# Patient Record
Sex: MALE | Race: BLACK OR AFRICAN AMERICAN | Employment: UNEMPLOYED | URBAN - METROPOLITAN AREA
[De-identification: names, ages, dates, MRNs, and addresses within clinical notes are randomized per-mention and may not be internally consistent; named-entity substitution may affect disease eponyms.]

---
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
## 2025-01-23 ENCOUNTER — HOSPITAL ENCOUNTER (EMERGENCY)
Age: 25
Discharge: HOME | End: 2025-01-23
Attending: EMERGENCY MEDICINE
Payer: MEDICAID

## 2025-01-23 ENCOUNTER — APPOINTMENT (OUTPATIENT)
Dept: GENERAL RADIOLOGY | Age: 25
End: 2025-01-23
Payer: MEDICAID

## 2025-01-23 VITALS
DIASTOLIC BLOOD PRESSURE: 83 MMHG | WEIGHT: 120 LBS | HEIGHT: 71 IN | HEART RATE: 92 BPM | SYSTOLIC BLOOD PRESSURE: 145 MMHG | TEMPERATURE: 102.2 F | OXYGEN SATURATION: 99 % | RESPIRATION RATE: 16 BRPM | BODY MASS INDEX: 16.8 KG/M2

## 2025-01-23 DIAGNOSIS — J10.1 INFLUENZA A: Primary | ICD-10-CM

## 2025-01-23 LAB
FLUAV RNA RESP QL NAA+PROBE: DETECTED
FLUBV RNA RESP QL NAA+PROBE: NOT DETECTED
SARS-COV-2 RNA RESP QL NAA+PROBE: NOT DETECTED
SOURCE: ABNORMAL
SPECIMEN DESCRIPTION: ABNORMAL

## 2025-01-23 PROCEDURE — 6370000000 HC RX 637 (ALT 250 FOR IP): Performed by: PHYSICIAN ASSISTANT

## 2025-01-23 PROCEDURE — 99284 EMERGENCY DEPT VISIT MOD MDM: CPT

## 2025-01-23 PROCEDURE — 87636 SARSCOV2 & INF A&B AMP PRB: CPT

## 2025-01-23 PROCEDURE — 71046 X-RAY EXAM CHEST 2 VIEWS: CPT

## 2025-01-23 RX ORDER — ACETAMINOPHEN 500 MG
1000 TABLET ORAL
Status: COMPLETED | OUTPATIENT
Start: 2025-01-23 | End: 2025-01-23

## 2025-01-23 RX ORDER — ALBUTEROL SULFATE 90 UG/1
2 INHALANT RESPIRATORY (INHALATION) 4 TIMES DAILY PRN
Qty: 54 G | Refills: 1 | Status: SHIPPED | OUTPATIENT
Start: 2025-01-23

## 2025-01-23 RX ORDER — OSELTAMIVIR PHOSPHATE 75 MG/1
75 CAPSULE ORAL 2 TIMES DAILY
Qty: 10 CAPSULE | Refills: 0 | Status: SHIPPED | OUTPATIENT
Start: 2025-01-23 | End: 2025-01-28

## 2025-01-23 RX ORDER — IBUPROFEN 600 MG/1
600 TABLET, FILM COATED ORAL
Status: COMPLETED | OUTPATIENT
Start: 2025-01-23 | End: 2025-01-23

## 2025-01-23 RX ADMIN — ACETAMINOPHEN 1000 MG: 500 TABLET, FILM COATED ORAL at 11:57

## 2025-01-23 RX ADMIN — IBUPROFEN 600 MG: 600 TABLET, FILM COATED ORAL at 11:57

## 2025-01-23 ASSESSMENT — ENCOUNTER SYMPTOMS
COUGH: 1
SORE THROAT: 0
RHINORRHEA: 1

## 2025-01-23 ASSESSMENT — PAIN SCALES - GENERAL
PAINLEVEL_OUTOF10: 6
PAINLEVEL_OUTOF10: 7

## 2025-01-23 ASSESSMENT — PAIN DESCRIPTION - LOCATION
LOCATION: CHEST
LOCATION: CHEST;GENERALIZED

## 2025-01-23 ASSESSMENT — PAIN - FUNCTIONAL ASSESSMENT: PAIN_FUNCTIONAL_ASSESSMENT: 0-10

## 2025-01-23 ASSESSMENT — LIFESTYLE VARIABLES: HOW OFTEN DO YOU HAVE A DRINK CONTAINING ALCOHOL: NEVER

## 2025-01-23 NOTE — ED PROVIDER NOTES
[facility] EMERGENCY DEPARTMENT  eMERGENCY dEPARTMENT eNCOUnter   Independent Attestation     Pt Name: Vilma Wright  MRN: [id number]  Birthdate 2000  Date of evaluation: 1/23/25       Vilma Wright is a 24 y.o. male who presents with Fever, Shortness of Breath (Grandma was recently diagnosed with pneumonia and he reports he's been short of breath with a fever for 3 days. Reports chest pain with inspiration.), Pleurisy, and Cough        Based on the medical record, the care appears appropriate. I was personally available for consultation in the Emergency Department.    Kashmir Mejia MD  Attending Emergency  Physician                Kashmir Mejia MD  01/23/25 1148

## 2025-01-23 NOTE — ED PROVIDER NOTES
[facility] EMERGENCY DEPARTMENT  eMERGENCY dEPARTMENT eNCOUnter      Pt Name: Vilma Wrigth  MRN: [id number]  Birthdate 2000  Date of evaluation: 1/23/25      CHIEF COMPLAINT       Chief Complaint   Patient presents with    Fever    Shortness of Breath     Grandma was recently diagnosed with pneumonia and he reports he's been short of breath with a fever for 3 days. Reports chest pain with inspiration.    Pleurisy    Cough         HISTORY OF PRESENT ILLNESS    Vilma Wright is a 24 y.o. male who presents complaining of cough body aches.  The history is provided by the patient.   Cold Symptoms  Presenting symptoms: congestion, cough, fever and rhinorrhea    Presenting symptoms: no sore throat    Severity:  Moderate  Onset quality:  Sudden  Duration:  2 days  Timing:  Intermittent  Progression:  Waxing and waning  Chronicity:  New  Relieved by:  Nothing  Worsened by:  Nothing  Ineffective treatments:  None tried  Associated symptoms: myalgias    Associated symptoms: no neck pain        REVIEW OF SYSTEMS       Review of Systems   Constitutional:  Positive for fever.   HENT:  Positive for congestion and rhinorrhea. Negative for sore throat.    Respiratory:  Positive for cough.    Musculoskeletal:  Positive for myalgias. Negative for neck pain.   All other systems reviewed and are negative.      PAST MEDICAL HISTORY     Past Medical History:   Diagnosis Date    ADHD (attention deficit hyperactivity disorder)     Fetal alcohol syndrome        SURGICAL HISTORY       Past Surgical History:   Procedure Laterality Date    CIRCUMCISION         CURRENT MEDICATIONS       Previous Medications    AMPHETAMINE-DEXTROAMPHETAMINE (ADDERALL XR) 30 MG EXTENDED RELEASE CAPSULE    Take 30 mg by mouth every morning .    BUPROPION (WELLBUTRIN SR) 150 MG EXTENDED RELEASE TABLET        RISPERIDONE (RISPERDAL) 1 MG TABLET    Take 1 mg by mouth daily       ALLERGIES     has No Known Allergies.    FAMILY HISTORY     He indicated that his

## 2025-01-23 NOTE — ED PROVIDER NOTES
[facility] EMERGENCY DEPARTMENT  eMERGENCY dEPARTMENT eNCOUnter      Pt Name: Vilma Wright  MRN: [id number]  Birthdate 2000  Date of evaluation: 1/23/25      CHIEF COMPLAINT       Chief Complaint   Patient presents with   • Fever   • Shortness of Breath     Grandma was recently diagnosed with pneumonia and he reports he's been short of breath with a fever for 3 days. Reports chest pain with inspiration.   • Pleurisy   • Cough         HISTORY OF PRESENT ILLNESS    Vilma Wright is a 24 y.o. male who presents complaining of ***   The history is provided by the patient.   Cold Symptoms  Presenting symptoms: congestion, cough and fever    Severity:  Moderate  Onset quality:  Sudden  Duration:  2 days  Timing:  Intermittent  Progression:  Waxing and waning  Chronicity:  New  Relieved by:  Nothing  Worsened by:  Nothing  Ineffective treatments:  None tried  Associated symptoms: myalgias    Risk factors: sick contacts        REVIEW OF SYSTEMS       Review of Systems   Constitutional:  Positive for fever.   HENT:  Positive for congestion.    Respiratory:  Positive for cough.    Musculoskeletal:  Positive for myalgias.   All other systems reviewed and are negative.      PAST MEDICAL HISTORY     Past Medical History:   Diagnosis Date   • ADHD (attention deficit hyperactivity disorder)    • Fetal alcohol syndrome        SURGICAL HISTORY       Past Surgical History:   Procedure Laterality Date   • CIRCUMCISION         CURRENT MEDICATIONS       Previous Medications    AMPHETAMINE-DEXTROAMPHETAMINE (ADDERALL XR) 30 MG EXTENDED RELEASE CAPSULE    Take 30 mg by mouth every morning .    BUPROPION (WELLBUTRIN SR) 150 MG EXTENDED RELEASE TABLET        RISPERIDONE (RISPERDAL) 1 MG TABLET    Take 1 mg by mouth daily       ALLERGIES     has No Known Allergies.    FAMILY HISTORY     He indicated that his mother is alive. He indicated that his father is alive. He indicated that his paternal grandmother is alive. He indicated that

## 2025-01-23 NOTE — DISCHARGE INSTRUCTIONS
Drink plenty of fluids  Tylenol Motrin for pain or fever  You can try the Tamiflu may help lessen the severity and length of influenza  Inhaler for cough or chest tightness  Follow-up with your doctor for recheck  Return if worse or other concerns